# Patient Record
(demographics unavailable — no encounter records)

---

## 2025-07-28 NOTE — HISTORY OF PRESENT ILLNESS
[6] : 6 [4] : 4 [Dull/Aching] : dull/aching [de-identified] : R wrist pain for 1 week  Swelling as well  Was seen at [facility], had MRI done  Was prescribed colchicine, which has been helpful   For the first time I independently reviewed the MRI of the R wrist done [facility] on 7/24/2025 The clinically relevant findings shows inflammatory arthropathy  [FreeTextEntry1] : R wrist

## 2025-07-28 NOTE — REASON FOR VISIT
[FreeTextEntry2] : 07/28/2025: 87-year-old male here today for: new patient R wrist pain, he had an MRI on 7/24/25 at [facility]. Denies injury/trauma. Has trouble putting pressure into the R wrist. Pain started a week ago.

## 2025-07-28 NOTE — ASSESSMENT
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

## 2025-07-28 NOTE — PHYSICAL EXAM
[de-identified] : R wrist: Swelling ulnar wrist  Tender TFCC Pain with pronation/supination Decreased wrist ROM +TFCC grind